# Patient Record
Sex: FEMALE | Race: OTHER | Employment: UNEMPLOYED | URBAN - METROPOLITAN AREA
[De-identification: names, ages, dates, MRNs, and addresses within clinical notes are randomized per-mention and may not be internally consistent; named-entity substitution may affect disease eponyms.]

---

## 2017-01-24 ENCOUNTER — TELEPHONE (OUTPATIENT)
Dept: PODIATRY CLINIC | Facility: CLINIC | Age: 77
End: 2017-01-24

## 2017-02-02 RX ORDER — BIOTIN 1 MG
TABLET ORAL
Qty: 50 STRIP | Refills: 11 | Status: SHIPPED | OUTPATIENT
Start: 2017-02-02

## 2017-02-02 RX ORDER — TRAMADOL HYDROCHLORIDE 50 MG/1
TABLET ORAL
Qty: 30 TABLET | Refills: 0 | OUTPATIENT
Start: 2017-02-02 | End: 2017-05-04

## 2017-02-02 RX ORDER — LANCETS
EACH MISCELLANEOUS
Qty: 100 EACH | Refills: 11 | Status: SHIPPED | OUTPATIENT
Start: 2017-02-02

## 2017-02-02 RX ORDER — BLOOD-GLUCOSE METER
KIT MISCELLANEOUS
Qty: 1 KIT | Refills: 0 | Status: SHIPPED | OUTPATIENT
Start: 2017-02-02

## 2017-02-02 NOTE — TELEPHONE ENCOUNTER
Tramadol Approved. Please phone in. Thanks. Patient is due for follow-up for chronic medical conditions. Please schedule a follow-up visit.

## 2017-02-03 NOTE — TELEPHONE ENCOUNTER
Tramadol 50 mg phoned to [facility] as directed by Dr Mallorie Bolton. Verbal given to Warner Isbell. Routed to phone room to please assist patient in scheduling a follow-up appointment.

## 2017-02-21 ENCOUNTER — OFFICE VISIT (OUTPATIENT)
Dept: INTERNAL MEDICINE CLINIC | Facility: CLINIC | Age: 77
End: 2017-02-21

## 2017-02-21 ENCOUNTER — APPOINTMENT (OUTPATIENT)
Dept: LAB | Facility: HOSPITAL | Age: 77
End: 2017-02-21
Attending: INTERNAL MEDICINE
Payer: MEDICAID

## 2017-02-21 VITALS
DIASTOLIC BLOOD PRESSURE: 81 MMHG | SYSTOLIC BLOOD PRESSURE: 146 MMHG | HEART RATE: 73 BPM | WEIGHT: 148.38 LBS | BODY MASS INDEX: 31 KG/M2 | TEMPERATURE: 99 F

## 2017-02-21 DIAGNOSIS — E78.5 HYPERLIPIDEMIA LDL GOAL <100: ICD-10-CM

## 2017-02-21 DIAGNOSIS — I10 ESSENTIAL HYPERTENSION: ICD-10-CM

## 2017-02-21 DIAGNOSIS — E11.9 CONTROLLED TYPE 2 DIABETES MELLITUS WITHOUT COMPLICATION, WITHOUT LONG-TERM CURRENT USE OF INSULIN (HCC): Primary | ICD-10-CM

## 2017-02-21 DIAGNOSIS — E11.9 CONTROLLED TYPE 2 DIABETES MELLITUS WITHOUT COMPLICATION, WITHOUT LONG-TERM CURRENT USE OF INSULIN (HCC): ICD-10-CM

## 2017-02-21 DIAGNOSIS — Z12.39 SPECIAL SCREENING EXAMINATION FOR NEOPLASM OF BREAST: ICD-10-CM

## 2017-02-21 LAB
ALBUMIN SERPL BCP-MCNC: 3.9 G/DL (ref 3.5–4.8)
ALBUMIN/GLOB SERPL: 1.2 {RATIO} (ref 1–2)
ALP SERPL-CCNC: 76 U/L (ref 32–100)
ALT SERPL-CCNC: 10 U/L (ref 14–54)
ANION GAP SERPL CALC-SCNC: 7 MMOL/L (ref 0–18)
AST SERPL-CCNC: 16 U/L (ref 15–41)
BILIRUB SERPL-MCNC: 0.8 MG/DL (ref 0.3–1.2)
BUN SERPL-MCNC: 15 MG/DL (ref 8–20)
BUN/CREAT SERPL: 17.6 (ref 10–20)
CALCIUM SERPL-MCNC: 9.4 MG/DL (ref 8.5–10.5)
CHLORIDE SERPL-SCNC: 103 MMOL/L (ref 95–110)
CHOLEST SERPL-MCNC: 291 MG/DL (ref 110–200)
CO2 SERPL-SCNC: 27 MMOL/L (ref 22–32)
CREAT SERPL-MCNC: 0.85 MG/DL (ref 0.5–1.5)
CREAT UR-MCNC: 82.9 MG/DL
GLOBULIN PLAS-MCNC: 3.3 G/DL (ref 2.5–3.7)
GLUCOSE SERPL-MCNC: 94 MG/DL (ref 70–99)
HBA1C MFR BLD: 5.8 % (ref 4–6)
HDLC SERPL-MCNC: 43 MG/DL
LDLC SERPL CALC-MCNC: 211 MG/DL (ref 0–99)
MICROALBUMIN UR-MCNC: 0.3 MG/DL (ref 0–1.8)
MICROALBUMIN/CREAT UR: 3.6 MG/G{CREAT} (ref 0–20)
NONHDLC SERPL-MCNC: 248 MG/DL
OSMOLALITY UR CALC.SUM OF ELEC: 285 MOSM/KG (ref 275–295)
POTASSIUM SERPL-SCNC: 4 MMOL/L (ref 3.3–5.1)
PROT SERPL-MCNC: 7.2 G/DL (ref 5.9–8.4)
SODIUM SERPL-SCNC: 137 MMOL/L (ref 136–144)
TRIGL SERPL-MCNC: 184 MG/DL (ref 1–149)

## 2017-02-21 PROCEDURE — 93010 ELECTROCARDIOGRAM REPORT: CPT

## 2017-02-21 PROCEDURE — 82043 UR ALBUMIN QUANTITATIVE: CPT

## 2017-02-21 PROCEDURE — 80053 COMPREHEN METABOLIC PANEL: CPT

## 2017-02-21 PROCEDURE — 83036 HEMOGLOBIN GLYCOSYLATED A1C: CPT

## 2017-02-21 PROCEDURE — 80061 LIPID PANEL: CPT

## 2017-02-21 PROCEDURE — 99212 OFFICE O/P EST SF 10 MIN: CPT | Performed by: INTERNAL MEDICINE

## 2017-02-21 PROCEDURE — 82570 ASSAY OF URINE CREATININE: CPT

## 2017-02-21 PROCEDURE — 36415 COLL VENOUS BLD VENIPUNCTURE: CPT

## 2017-02-21 PROCEDURE — 99214 OFFICE O/P EST MOD 30 MIN: CPT | Performed by: INTERNAL MEDICINE

## 2017-02-21 PROCEDURE — 93005 ELECTROCARDIOGRAM TRACING: CPT

## 2017-02-21 NOTE — PROGRESS NOTES
HPI:    Patient ID: Ildefonso Mijares is a 64year old female who arrives today with her daughter. The patient is due for a breast cancer screening. HPI  Diabetes  She presents for her follow-up diabetic visit. She has type 2 diabetes mellitus.  Disease co (Negative for melena in the stool). Musculoskeletal:        Positive for foot pain   Skin:        Positive for fungus of the toenails   Neurological: Negative for syncope and speech difficulty. Psychiatric/Behavioral: Negative for confusion.  The patien 80 MG Oral Tab Take 1 tablet by mouth once daily. Disp: 90 tablet Rfl: 1   triamcinolone acetonide (KENALOG) 0.1 % Apply Externally Cream Apply to affected area twice a day as needed.  Disp: 15 g Rfl: 1     Allergies:No Known Allergies   PHYSICAL EXAM:   Ph TRIG 148 02/25/2014       Lab Results  Component Value Date   AST 16 12/02/2015   AST 16 08/04/2015   AST 17 09/19/2014       Lab Results  Component Value Date   ALT 9* 12/02/2015   ALT 10* 08/04/2015   ALT 11* 09/19/2014              ASSESSMENT/PLAN: 12-LEAD        (E78.5) Hyperlipidemia LDL goal <100  Lipid panel on 08/04/2015 indicated Cholesterol = 195, HDL= 46, Triglycerides = 198 elevated, and LDL= 109 elevated. Current treatment includes Pravastatin Sodium 80 MG.  She is compliant with her medicat

## 2017-03-21 ENCOUNTER — OFFICE VISIT (OUTPATIENT)
Dept: PODIATRY CLINIC | Facility: CLINIC | Age: 77
End: 2017-03-21

## 2017-03-21 DIAGNOSIS — E08.41 DIABETIC MONONEUROPATHY ASSOCIATED WITH DIABETES MELLITUS DUE TO UNDERLYING CONDITION (HCC): ICD-10-CM

## 2017-03-21 DIAGNOSIS — B35.1 ONYCHOMYCOSIS OF TOENAIL: ICD-10-CM

## 2017-03-21 DIAGNOSIS — E11.9 COMPREHENSIVE DIABETIC FOOT EXAMINATION, TYPE 2 DM, ENCOUNTER FOR (HCC): Primary | ICD-10-CM

## 2017-03-21 PROCEDURE — 99243 OFF/OP CNSLTJ NEW/EST LOW 30: CPT

## 2017-03-21 NOTE — PROGRESS NOTES
HPI:    Patient ID: Kaylah Orellana is a 76year old female. HPI     History is obtained from daughter(she adopted the patient as adopted mom-). 1. Routine Diabetic Foot Care  Patient has arrived for routine diabetic foot care.  This Glaucoma Neg    • Diabetes Neg         Smoking Status: Never Smoker                      Smokeless Status: Never Used                        Alcohol Use: No                    Current Outpatient Prescriptions:  TRAMADOL HCL 50 MG Oral Tab TAKE ONE TABLET B Pulmonary/Chest: Effort normal. No respiratory distress. Musculoskeletal: Normal range of motion. Neurological: She is alert and oriented to person, place, and time. Skin: Skin is dry.    Toenail mycotic changes- thick, discolored and distorted This Visit:  No prescriptions requested or ordered in this encounter    Imaging & Referrals:  None       ID#[id number]  By signing my name below, I, Bouchra Hamilton,  attest that this documentation has been prepared under the direction and in the presence of Narendra Davies

## 2017-04-04 ENCOUNTER — HOSPITAL ENCOUNTER (OUTPATIENT)
Dept: MAMMOGRAPHY | Age: 77
Discharge: HOME | End: 2017-04-04
Attending: INTERNAL MEDICINE
Payer: MEDICAID

## 2017-04-04 DIAGNOSIS — Z12.39 SPECIAL SCREENING EXAMINATION FOR NEOPLASM OF BREAST: ICD-10-CM

## 2017-04-04 PROCEDURE — 77067 SCR MAMMO BI INCL CAD: CPT

## 2017-04-27 ENCOUNTER — TELEPHONE (OUTPATIENT)
Dept: INTERNAL MEDICINE CLINIC | Facility: CLINIC | Age: 77
End: 2017-04-27

## 2017-04-27 DIAGNOSIS — E11.8 CONTROLLED DIABETES MELLITUS TYPE 2 WITH COMPLICATIONS, UNSPECIFIED LONG TERM INSULIN USE STATUS: ICD-10-CM

## 2017-04-27 DIAGNOSIS — I10 ESSENTIAL HYPERTENSION WITH GOAL BLOOD PRESSURE LESS THAN 130/85: Primary | ICD-10-CM

## 2017-04-29 RX ORDER — LOSARTAN POTASSIUM 100 MG/1
100 TABLET ORAL
Qty: 90 TABLET | Refills: 0 | Status: SHIPPED | OUTPATIENT
Start: 2017-04-29 | End: 2017-05-04

## 2017-04-29 RX ORDER — CARVEDILOL 12.5 MG/1
12.5 TABLET ORAL 2 TIMES DAILY WITH MEALS
Qty: 180 TABLET | Refills: 0 | Status: SHIPPED | OUTPATIENT
Start: 2017-04-29 | End: 2017-05-04

## 2017-04-29 NOTE — TELEPHONE ENCOUNTER
Hypertensive Medications - [facility]  Protocol Criteria:  · Appointment scheduled in the past 6 months or in the next 3 months  · BMP or CMP in the past 12 months  · Creatinine result < 2  Recent Visits       Provider Department Primary Dx    2

## 2017-05-02 ENCOUNTER — TELEPHONE (OUTPATIENT)
Dept: INTERNAL MEDICINE CLINIC | Facility: CLINIC | Age: 77
End: 2017-05-02

## 2017-05-02 NOTE — TELEPHONE ENCOUNTER
[id number] - [address], [address] [facility] [phone], [phone]      Current Outpatient Prescriptions:  aspirin (ASPIRIN EC LOW DOSE) 81 MG Oral Tab EC Take 1 tablet (81 mg total) by mouth once daily.  Disp: 90 tablet Rfl: 3

## 2017-05-04 ENCOUNTER — OFFICE VISIT (OUTPATIENT)
Dept: INTERNAL MEDICINE CLINIC | Facility: CLINIC | Age: 77
End: 2017-05-04

## 2017-05-04 ENCOUNTER — TELEPHONE (OUTPATIENT)
Dept: INTERNAL MEDICINE CLINIC | Facility: CLINIC | Age: 77
End: 2017-05-04

## 2017-05-04 VITALS
TEMPERATURE: 99 F | BODY MASS INDEX: 31 KG/M2 | HEART RATE: 72 BPM | DIASTOLIC BLOOD PRESSURE: 84 MMHG | WEIGHT: 148.38 LBS | SYSTOLIC BLOOD PRESSURE: 180 MMHG

## 2017-05-04 DIAGNOSIS — E11.8 CONTROLLED DIABETES MELLITUS TYPE 2 WITH COMPLICATIONS, UNSPECIFIED LONG TERM INSULIN USE STATUS: ICD-10-CM

## 2017-05-04 DIAGNOSIS — E78.5 HYPERLIPIDEMIA LDL GOAL <100: ICD-10-CM

## 2017-05-04 DIAGNOSIS — I10 ESSENTIAL HYPERTENSION WITH GOAL BLOOD PRESSURE LESS THAN 130/85: Primary | ICD-10-CM

## 2017-05-04 DIAGNOSIS — M15.9 GENERALIZED OA: ICD-10-CM

## 2017-05-04 PROCEDURE — 99212 OFFICE O/P EST SF 10 MIN: CPT | Performed by: INTERNAL MEDICINE

## 2017-05-04 PROCEDURE — 99214 OFFICE O/P EST MOD 30 MIN: CPT | Performed by: INTERNAL MEDICINE

## 2017-05-04 RX ORDER — HYDROCHLOROTHIAZIDE 25 MG/1
TABLET ORAL
Qty: 90 TABLET | Refills: 3 | Status: SHIPPED | OUTPATIENT
Start: 2017-05-04 | End: 2017-05-11

## 2017-05-04 RX ORDER — TRAMADOL HYDROCHLORIDE 50 MG/1
50 TABLET ORAL 2 TIMES DAILY PRN
Qty: 30 TABLET | Refills: 3 | Status: SHIPPED | OUTPATIENT
Start: 2017-05-04

## 2017-05-04 RX ORDER — CARVEDILOL 12.5 MG/1
12.5 TABLET ORAL 2 TIMES DAILY WITH MEALS
Qty: 180 TABLET | Refills: 0 | Status: SHIPPED | OUTPATIENT
Start: 2017-05-04

## 2017-05-04 RX ORDER — ASPIRIN 81 MG/1
81 TABLET ORAL
Qty: 90 TABLET | Refills: 3 | Status: SHIPPED | OUTPATIENT
Start: 2017-05-04

## 2017-05-04 RX ORDER — LOSARTAN POTASSIUM 100 MG/1
100 TABLET ORAL
Qty: 90 TABLET | Refills: 3 | Status: SHIPPED | OUTPATIENT
Start: 2017-05-04

## 2017-05-04 RX ORDER — PRAVASTATIN SODIUM 80 MG/1
80 TABLET ORAL
Qty: 90 TABLET | Refills: 3 | Status: SHIPPED | OUTPATIENT
Start: 2017-05-04

## 2017-05-04 NOTE — PROGRESS NOTES
HPI:    Patient ID: Consuelo Barboza is a 76year old female. Diabetes  She presents for her follow-up diabetic visit. She has type 2 diabetes mellitus. Her disease course has been stable. Pertinent negatives for hypoglycemia include no speech difficulty. 180 tablet Rfl: 0   hydrochlorothiazide 25 MG Oral Tab TAKE 1 TABLET (25 MG TOTAL) BY MOUTH DAILY. Disp: 90 tablet Rfl: 3   losartan 100 MG Oral Tab Take 1 tablet (100 mg total) by mouth once daily.  Disp: 90 tablet Rfl: 3   MetFORMIN HCl 500 MG Oral Tab Ta Date   A1C 5.8 02/21/2017   A1C 5.9 08/04/2015   A1C 5.7 09/19/2014     Cholesterol:       Lab Results  Component Value Date   CHOLEST 291* 02/21/2017   CHOLEST 195 08/04/2015   CHOLEST 263* 09/19/2014       Lab Results  Component Value Date   HDL 43 02/21 plan were dicussed with the patient and the patient verbalized understanding of all instructions.     Meds This Visit:  Pending Prescriptions Disp Refills    aspirin (ASPIRIN EC LOW DOSE) 81 MG Oral Tab EC 90 tablet 3     Sig: Take 1 tablet (81 mg total) by

## 2017-05-04 NOTE — TELEPHONE ENCOUNTER
Pharmacy requesting refill for Pt. Will fax to [facility].        Medication Quantity Refills Start End      HYDROCHLOROTHIAZIDE 25 MG Oral Tab 90 tablet 1 11/19/2016      Sig :  TAKE 1 TABLET (25 MG TOTAL) BY MOUTH DAILY.       Route:   (none)       Or

## 2017-05-09 NOTE — TELEPHONE ENCOUNTER
Rx request for Aspirin 81 mg, Addressed and Filled by MD on 5/4/17 #90 with 3 refills. No further action required at this time.     Refill Protocol Appointment Criteria  · Appointment scheduled in the past 6 months or in the next 3 months  Recent Visits

## 2017-05-11 RX ORDER — HYDROCHLOROTHIAZIDE 25 MG/1
TABLET ORAL
Qty: 90 TABLET | Refills: 3 | Status: SHIPPED | OUTPATIENT
Start: 2017-05-11

## 2017-05-12 NOTE — TELEPHONE ENCOUNTER
Active order just change in pharmacy    Hypertensive Medications  Protocol Criteria:  · Appointment scheduled in the past 6 months or in the next 3 months  · BMP or CMP in the past 12 months  · Creatinine result < 2  Recent Visits       Provider Department

## 2017-05-22 ENCOUNTER — TELEPHONE (OUTPATIENT)
Dept: PODIATRY CLINIC | Facility: CLINIC | Age: 77
End: 2017-05-22

## 2017-05-22 NOTE — TELEPHONE ENCOUNTER
pts daughter called, cancelled and rescheduled her Mothers appt for this morning with SCR.     RS to 6/15/17 at 1:30pm.

## 2017-06-08 ENCOUNTER — OFFICE VISIT (OUTPATIENT)
Dept: OPHTHALMOLOGY | Facility: CLINIC | Age: 77
End: 2017-06-08

## 2017-06-08 DIAGNOSIS — H02.886 MEIBOMIAN GLAND DYSFUNCTION (MGD) OF BOTH EYES: ICD-10-CM

## 2017-06-08 DIAGNOSIS — E11.9 DIABETES MELLITUS TYPE 2 WITHOUT RETINOPATHY (HCC): Primary | ICD-10-CM

## 2017-06-08 DIAGNOSIS — H25.13 CATARACT, NUCLEAR SCLEROTIC, BOTH EYES: ICD-10-CM

## 2017-06-08 DIAGNOSIS — H02.883 MEIBOMIAN GLAND DYSFUNCTION (MGD) OF BOTH EYES: ICD-10-CM

## 2017-06-08 PROCEDURE — 99243 OFF/OP CNSLTJ NEW/EST LOW 30: CPT | Performed by: OPHTHALMOLOGY

## 2017-06-08 PROCEDURE — 92015 DETERMINE REFRACTIVE STATE: CPT | Performed by: OPHTHALMOLOGY

## 2017-06-08 PROCEDURE — 99212 OFFICE O/P EST SF 10 MIN: CPT | Performed by: OPHTHALMOLOGY

## 2017-06-08 NOTE — PATIENT INSTRUCTIONS
Diabetes mellitus type 2 without retinopathy ([facility] [address]  Diabetes type II: no background of retinopathy, no signs of neovascularization noted. Discussed ocular and systemic benefits of blood sugar control.   Diagnosis and treatment discussed in detail with jojo

## 2017-06-08 NOTE — PROGRESS NOTES
Elder Chung is a 76year old female.     HPI:     HPI     Diabetic Eye Exam    Additional comments: Pt has been a diabetic for 15+ years  15+ years on pills/  0 years on Insulin   Pt checks her BS once a d ay  Pt's last blood sugar was  92  Last HA1C was  Pravastatin Sodium 80 MG Oral Tab Take 1 tablet (80 mg total) by mouth once daily. Disp: 90 tablet Rfl: 3   TraMADol HCl 50 MG Oral Tab Take 1 tablet (50 mg total) by mouth 2 (two) times daily as needed for Pain.  Disp: 30 tablet Rfl: 3   EQL COLOR LANCET Right Left    Disc Good rim Good rim    C/D Ratio 0.1 0.1    Macula Normal, no BDR Normal, no BDR    Vessels Normal Normal    Periphery Normal Normal            Refraction     Wearing Rx     Type:  No glasses      Manifest Refraction (Auto)      Sphere Cyl for Dilated exam.    6/8/2017  Scribed by: Emil Carbajal MD

## 2017-06-08 NOTE — ASSESSMENT & PLAN NOTE
Patient was instructed to use warm compresses to the eyelids twice a day everyday. Instructions for warm compress use:   Patient should place wash compresses on both eyelids for 5 minutes every morning and every night.   After 5 minutes of holding the wa

## 2017-06-15 ENCOUNTER — OFFICE VISIT (OUTPATIENT)
Dept: PODIATRY CLINIC | Facility: CLINIC | Age: 77
End: 2017-06-15

## 2017-06-15 DIAGNOSIS — M79.675 PAIN OF TOE OF LEFT FOOT: ICD-10-CM

## 2017-06-15 DIAGNOSIS — M79.674 PAIN OF TOE OF RIGHT FOOT: ICD-10-CM

## 2017-06-15 DIAGNOSIS — E11.9 DIABETES MELLITUS, STABLE (HCC): Primary | ICD-10-CM

## 2017-06-15 DIAGNOSIS — B35.1 DERMATOPHYTOSIS OF NAIL: ICD-10-CM

## 2017-06-15 PROCEDURE — 11721 DEBRIDE NAIL 6 OR MORE: CPT | Performed by: PODIATRIST

## 2017-06-15 NOTE — PROGRESS NOTES
HPI:    Patient ID: Tim Danielson is a 76year old female. HPI  This pleasant 68-year-old female presents for care associated with her painful fungus toenails. She reports relief by previous debridement.   Her most recent A1c was 5.8 and her fasting blo although diminished. She is marked dystrophy with thickness discoloration and subungual debris associated with long-standing chronic mycosis. There is no open or draining no present sign of infection.   Careful and complete debridement of each nail was ac

## 2017-06-29 ENCOUNTER — TELEPHONE (OUTPATIENT)
Dept: FAMILY MEDICINE CLINIC | Facility: CLINIC | Age: 77
End: 2017-06-29

## 2017-06-29 NOTE — TELEPHONE ENCOUNTER
Protocol Used: Weakness (Generalized) and Fatigue (Adult)  Protocol-Based Disposition: See Today in Office  Actions Requested: EG FYI acute visit booked 6/30  Situation/Background   Problem: fatigue and lethargy   Onset: weeks   Associated Symptoms: none Sounds like a life-threatening emergency to the triager  * Difficulty breathing  * Heart beating < 50 beats per minute OR > 140 beats per minute  * Extra heartbeats OR irregular heart beating (i.e., \"palpitations\")  * Follows bleeding (e.g., from vomitin

## 2017-06-30 ENCOUNTER — OFFICE VISIT (OUTPATIENT)
Dept: INTERNAL MEDICINE CLINIC | Facility: CLINIC | Age: 77
End: 2017-06-30

## 2017-06-30 VITALS
HEART RATE: 71 BPM | SYSTOLIC BLOOD PRESSURE: 142 MMHG | DIASTOLIC BLOOD PRESSURE: 62 MMHG | WEIGHT: 150.38 LBS | TEMPERATURE: 98 F | BODY MASS INDEX: 31 KG/M2

## 2017-06-30 DIAGNOSIS — E78.5 HYPERLIPIDEMIA LDL GOAL <100: ICD-10-CM

## 2017-06-30 DIAGNOSIS — F41.9 ANXIETY: ICD-10-CM

## 2017-06-30 DIAGNOSIS — I10 ESSENTIAL HYPERTENSION: ICD-10-CM

## 2017-06-30 DIAGNOSIS — E11.9 CONTROLLED TYPE 2 DIABETES MELLITUS WITHOUT COMPLICATION, WITHOUT LONG-TERM CURRENT USE OF INSULIN (HCC): Primary | ICD-10-CM

## 2017-06-30 PROCEDURE — 99212 OFFICE O/P EST SF 10 MIN: CPT | Performed by: INTERNAL MEDICINE

## 2017-06-30 PROCEDURE — 99214 OFFICE O/P EST MOD 30 MIN: CPT | Performed by: INTERNAL MEDICINE

## 2017-06-30 RX ORDER — ALPRAZOLAM 0.25 MG/1
0.25 TABLET ORAL NIGHTLY PRN
Qty: 30 TABLET | Refills: 1 | Status: SHIPPED | OUTPATIENT
Start: 2017-06-30

## 2017-06-30 NOTE — PROGRESS NOTES
HPI:    Patient ID: Rody Mcgraw is a 76year old female. Diabetes   She presents for her follow-up diabetic visit. She has type 2 diabetes mellitus. Disease course: stable. Home glucose has been well controlled.  Pertinent negatives for hypoglycemia i Smokeless tobacco: Never Used                      Alcohol use: No                      Current Outpatient Prescriptions:  hydrochlorothiazide 25 MG Oral Tab TAKE 1 TABLET (25 MG TOTAL) BY MOUTH DAILY.  Disp: 90 t note and vitals reviewed. 06/30/17  0958 06/30/17  1104   BP: 142/80 142/62   Pulse: 71    Temp: 97.7 °F (36.5 °C)    TempSrc: Oral    Weight: 150 lb 6.4 oz (68.2 kg)      Body mass index is 31.43 kg/m².      HGBA1C:      Lab Results  Component Valu 0.25 MG Oral Tab 30 tablet 1      Sig: Take 1 tablet (0.25 mg total) by mouth nightly as needed for Sleep.            Imaging & Referrals:  None       ID#[id number]  By signing my name below, Di Page,  attjulee that this documentation has been prepared un

## 2017-07-27 ENCOUNTER — TELEPHONE (OUTPATIENT)
Dept: FAMILY MEDICINE CLINIC | Facility: CLINIC | Age: 77
End: 2017-07-27

## 2017-07-27 DIAGNOSIS — Z12.11 COLON CANCER SCREENING: ICD-10-CM

## 2017-07-27 DIAGNOSIS — E78.5 HYPERLIPIDEMIA LDL GOAL <100: ICD-10-CM

## 2017-07-27 DIAGNOSIS — E11.9 CONTROLLED TYPE 2 DIABETES MELLITUS WITHOUT COMPLICATION, WITHOUT LONG-TERM CURRENT USE OF INSULIN (HCC): Primary | ICD-10-CM

## 2017-07-27 NOTE — TELEPHONE ENCOUNTER
Pt daughter is calling want to know if pt need any lab drawn if want to know if order can be place   Daughter is requesting a call back

## 2017-07-28 NOTE — TELEPHONE ENCOUNTER
I called the patient's daughter and discuss what blood tests and urine she needs for next appointment. Updated the blood test for next visit in August.  She was also given a referral for GI for colon cancer screening.

## 2017-07-30 ENCOUNTER — HOSPITAL ENCOUNTER (OUTPATIENT)
Age: 77
Discharge: HOME | End: 2017-07-30
Attending: EMERGENCY MEDICINE
Payer: COMMERCIAL

## 2017-07-30 VITALS
TEMPERATURE: 98 F | WEIGHT: 149 LBS | RESPIRATION RATE: 20 BRPM | SYSTOLIC BLOOD PRESSURE: 143 MMHG | HEART RATE: 88 BPM | OXYGEN SATURATION: 98 % | BODY MASS INDEX: 31 KG/M2 | DIASTOLIC BLOOD PRESSURE: 69 MMHG

## 2017-07-30 DIAGNOSIS — IMO0001 HYMENOPTERA STING, ACCIDENTAL OR UNINTENTIONAL, INITIAL ENCOUNTER: Primary | ICD-10-CM

## 2017-07-30 PROCEDURE — 99213 OFFICE O/P EST LOW 20 MIN: CPT

## 2017-07-30 PROCEDURE — 99214 OFFICE O/P EST MOD 30 MIN: CPT

## 2017-07-30 NOTE — ED PROVIDER NOTES
Patient Seen in: [facility] [address] [place] [address]    History   Patient presents with:  Bite Sting,Insect (integumentary)    Stated Complaint: allergic reaction    HPI  Patient states yesterday they were cutting down a tree close by where she MetFORMIN HCl 500 MG Oral Tab,  Take 1 tablet (500 mg total) by mouth 2 (two) times daily with meals. Pravastatin Sodium 80 MG Oral Tab,  Take 1 tablet (80 mg total) by mouth once daily.    TraMADol HCl 50 MG Oral Tab,  Take 1 tablet (50 mg total) by margarito Neck: Normal range of motion. Neck supple. No tracheal deviation present. Cardiovascular: Normal rate, regular rhythm, normal heart sounds and intact distal pulses. Pulmonary/Chest: Effort normal and breath sounds normal. No stridor.  No respiratory di

## 2017-07-30 NOTE — ED NOTES
Prescription given with use instructions and follow up care. go to the ed for new or worse concerns redness swelling pain fever.

## 2017-07-30 NOTE — ED INITIAL ASSESSMENT (HPI)
Possible insect bite /sting to inner rt anticub area since yesterday too tramadol for pain small red area noted.

## 2017-08-25 ENCOUNTER — TELEPHONE (OUTPATIENT)
Dept: OTHER | Age: 77
End: 2017-08-25

## 2017-09-05 ENCOUNTER — TELEPHONE (OUTPATIENT)
Dept: ORTHOPEDICS CLINIC | Facility: CLINIC | Age: 77
End: 2017-09-05

## 2017-09-05 NOTE — TELEPHONE ENCOUNTER
Call to [facility], No answer. Left voice message. REquesting call back to discuss her illinicare insurance and that it will be no longer taken as of 10-1-17.

## 2017-09-12 ENCOUNTER — OFFICE VISIT (OUTPATIENT)
Dept: PODIATRY CLINIC | Facility: CLINIC | Age: 77
End: 2017-09-12

## 2017-09-12 DIAGNOSIS — B35.1 DERMATOPHYTOSIS OF NAIL: ICD-10-CM

## 2017-09-12 DIAGNOSIS — M79.674 PAIN OF TOE OF RIGHT FOOT: ICD-10-CM

## 2017-09-12 DIAGNOSIS — M79.675 PAIN OF TOE OF LEFT FOOT: ICD-10-CM

## 2017-09-12 DIAGNOSIS — E11.9 DIABETES MELLITUS, STABLE (HCC): Primary | ICD-10-CM

## 2017-09-12 PROCEDURE — 11721 DEBRIDE NAIL 6 OR MORE: CPT | Performed by: PODIATRIST

## 2017-09-12 NOTE — PROGRESS NOTES
HPI:    Patient ID: Katty Carlton is a 76year old female. HPI  This pleasant 49-year-old female presents for care associated with her painful toenails. She reports relief by previous debridement.   Review of Systems  I reviewed present medical status, exam patient is recurrent pain associated with her fungus toenails. There are no present signs of infection there is no open or draining noted. Careful and complete debridement of all nails was accomplished today without incident.   I reduced nail, subung

## 2017-10-06 ENCOUNTER — TELEPHONE (OUTPATIENT)
Dept: INTERNAL MEDICINE CLINIC | Facility: CLINIC | Age: 77
End: 2017-10-06

## 2017-10-06 DIAGNOSIS — E11.8 CONTROLLED DIABETES MELLITUS TYPE 2 WITH COMPLICATIONS, UNSPECIFIED LONG TERM INSULIN USE STATUS: ICD-10-CM

## 2017-10-07 NOTE — TELEPHONE ENCOUNTER
Diabetes Medications  Protocol Criteria:  · Appointment scheduled in the past 6 months or the next 3 months  · A1C < 7.5 in the past 6 months  · Creatinine in the past 12 months  · Creatinine result < 1.5   Recent Outpatient Visits            3 weeks ago D

## 2019-04-10 NOTE — Clinical Note
"Requested Prescriptions   Pending Prescriptions Disp Refills     acetaminophen (TYLENOL) 325 MG tablet 30 tablet 0     Sig: Take 2 tablets (650 mg) by mouth every 6 hours as needed for mild pain       Analgesics (Non-Narcotic Tylenol and ASA Only) Failed - 4/10/2019 11:08 AM        Failed - Medication is active on med list        Passed - Recent (12 mo) or future (30 days) visit within the authorizing provider's specialty     Patient had office visit in the last 12 months or has a visit in the next 30 days with authorizing provider or within the authorizing provider's specialty.  See \"Patient Info\" tab in inbasket, or \"Choose Columns\" in Meds & Orders section of the refill encounter.              Passed - Patient is 7 months old or older     If patient is a peds patient of the age 7 mos -12 years, ok to refill using weight-based dosing.     If >3g daily and/or sig is not \"prn\", check for liver enzymes. If normal in the last year, ok to refill.  If not, refer to the provider.          Routing refill request to provider for review/approval because:  Drug not active on patient's medication list        " 3/21/2017      Dear Ravindra Varela MD,    Thank you for allowing me to participate in your patient's care. We appreciate your confidence in their care for your patient.     The patient will find the results of our examination and our treatment recommen

## 2021-01-18 NOTE — TELEPHONE ENCOUNTER
Dr. Divya Hernandez RN from [facility] ER calling--requesting patient HTN and DM meds and doses-current list given to Georgia Smiley.     Patient in ER currently--Tunde states patient was reaching up for a towel the other day and a heavy book hit her head--le
I called the Er and pt. Was already released.
17-Jan-2021

## 2021-02-06 DIAGNOSIS — Z23 NEED FOR VACCINATION: ICD-10-CM
